# Patient Record
Sex: MALE | URBAN - METROPOLITAN AREA
[De-identification: names, ages, dates, MRNs, and addresses within clinical notes are randomized per-mention and may not be internally consistent; named-entity substitution may affect disease eponyms.]

---

## 2024-09-12 ENCOUNTER — OFFICE VISIT (OUTPATIENT)
Dept: URBAN - METROPOLITAN AREA CLINIC 113 | Facility: CLINIC | Age: 24
End: 2024-09-12
Payer: COMMERCIAL

## 2024-09-12 ENCOUNTER — LAB OUTSIDE AN ENCOUNTER (OUTPATIENT)
Dept: URBAN - METROPOLITAN AREA CLINIC 113 | Facility: CLINIC | Age: 24
End: 2024-09-12

## 2024-09-12 ENCOUNTER — DASHBOARD ENCOUNTERS (OUTPATIENT)
Age: 24
End: 2024-09-12

## 2024-09-12 VITALS — WEIGHT: 274 LBS | BODY MASS INDEX: 37.11 KG/M2 | TEMPERATURE: 97.3 F | RESPIRATION RATE: 18 BRPM | HEIGHT: 72 IN

## 2024-09-12 DIAGNOSIS — K21.9 GERD: ICD-10-CM

## 2024-09-12 PROCEDURE — 99204 OFFICE O/P NEW MOD 45 MIN: CPT | Performed by: NURSE PRACTITIONER

## 2024-09-12 RX ORDER — SUCRALFATE 1 G/1
1 TABLET ON AN EMPTY STOMACH TABLET ORAL
Qty: 120 TABLET | Refills: 3 | OUTPATIENT
Start: 2024-09-12 | End: 2025-01-09

## 2024-09-12 RX ORDER — DICLOFENAC SODIUM 75 MG/1
1 TABLET AS NEEDED TABLET, DELAYED RELEASE ORAL TWICE A DAY
Status: ACTIVE | COMMUNITY

## 2024-09-12 NOTE — HPI-TODAY'S VISIT:
23-year-old male presenting for evaluation of abdominal pain. Within the last month, he has experienced increasing symptoms of reflux and upper abdominal pain.  This seemed to begin after being on anti-inflammatories due to a knee surgery.  He complains of daily exacerbations of stabbing epigastric pain, stating his stomach feels like it is "on fire.".  He reports associated heartburn with occasional episodes of regurgitation.  He reports little relief with OTC omeprazole.  The symptoms are often worsened with eating.  He denies dysphagia, weight loss, red blood per rectum or black stool.  He is taking diclofenac twice daily.

## 2024-09-19 ENCOUNTER — CLAIMS CREATED FROM THE CLAIM WINDOW (OUTPATIENT)
Dept: URBAN - METROPOLITAN AREA CLINIC 4 | Facility: CLINIC | Age: 24
End: 2024-09-19
Payer: COMMERCIAL

## 2024-09-19 ENCOUNTER — CLAIMS CREATED FROM THE CLAIM WINDOW (OUTPATIENT)
Dept: URBAN - METROPOLITAN AREA SURGERY CENTER 25 | Facility: SURGERY CENTER | Age: 24
End: 2024-09-19
Payer: COMMERCIAL

## 2024-09-19 ENCOUNTER — LAB OUTSIDE AN ENCOUNTER (OUTPATIENT)
Dept: URBAN - METROPOLITAN AREA CLINIC 113 | Facility: CLINIC | Age: 24
End: 2024-09-19

## 2024-09-19 ENCOUNTER — TELEPHONE ENCOUNTER (OUTPATIENT)
Dept: URBAN - METROPOLITAN AREA CLINIC 113 | Facility: CLINIC | Age: 24
End: 2024-09-19

## 2024-09-19 DIAGNOSIS — R10.13 ABDOMINAL DISCOMFORT, EPIGASTRIC: ICD-10-CM

## 2024-09-19 DIAGNOSIS — K29.60 OTHER GASTRITIS WITHOUT BLEEDING: ICD-10-CM

## 2024-09-19 DIAGNOSIS — K21.00 GERD WITH ESOPHAGITIS WITHOUT BLEEDING: ICD-10-CM

## 2024-09-19 DIAGNOSIS — K21.00 GASTRO-ESOPHAGEAL REFLUX DISEASE WITH ESOPHAGITIS, WITHOUT BLEEDING: ICD-10-CM

## 2024-09-19 DIAGNOSIS — K31.89 OTHER DISEASES OF STOMACH AND DUODENUM: ICD-10-CM

## 2024-09-19 DIAGNOSIS — K26.9 DUODENAL ULCER, UNSPECIFIED AS ACUTE OR CHRONIC, WITHOUT HEMORRHAGE OR PERFORATION: ICD-10-CM

## 2024-09-19 PROCEDURE — 88312 SPECIAL STAINS GROUP 1: CPT | Performed by: PATHOLOGY

## 2024-09-19 PROCEDURE — 43239 EGD BIOPSY SINGLE/MULTIPLE: CPT | Performed by: INTERNAL MEDICINE

## 2024-09-19 PROCEDURE — 00731 ANES UPR GI NDSC PX NOS: CPT | Performed by: NURSE ANESTHETIST, CERTIFIED REGISTERED

## 2024-09-19 PROCEDURE — 88305 TISSUE EXAM BY PATHOLOGIST: CPT | Performed by: PATHOLOGY

## 2024-09-19 RX ORDER — DICLOFENAC SODIUM 75 MG/1
1 TABLET AS NEEDED TABLET, DELAYED RELEASE ORAL TWICE A DAY
Status: ACTIVE | COMMUNITY

## 2024-09-19 RX ORDER — DICYCLOMINE HYDROCHLORIDE 20 MG/1
1 TABLET TABLET ORAL
Qty: 60 | Refills: 11 | OUTPATIENT
Start: 2024-09-19 | End: 2025-09-14

## 2024-09-19 RX ORDER — PANTOPRAZOLE SODIUM 40 MG/1
1 TABLET TABLET, DELAYED RELEASE ORAL TWICE DAILY
Qty: 60 | Refills: 11 | OUTPATIENT
Start: 2024-09-19

## 2024-09-19 RX ORDER — SUCRALFATE 1 G/1
1 TABLET ON AN EMPTY STOMACH TABLET ORAL
Qty: 120 TABLET | Refills: 3 | Status: ACTIVE | COMMUNITY
Start: 2024-09-12 | End: 2025-01-09

## 2024-10-10 ENCOUNTER — OFFICE VISIT (OUTPATIENT)
Dept: URBAN - METROPOLITAN AREA CLINIC 113 | Facility: CLINIC | Age: 24
End: 2024-10-10
Payer: COMMERCIAL

## 2024-10-10 ENCOUNTER — LAB OUTSIDE AN ENCOUNTER (OUTPATIENT)
Dept: URBAN - METROPOLITAN AREA CLINIC 113 | Facility: CLINIC | Age: 24
End: 2024-10-10

## 2024-10-10 VITALS
DIASTOLIC BLOOD PRESSURE: 80 MMHG | TEMPERATURE: 97.5 F | HEIGHT: 72 IN | WEIGHT: 274.8 LBS | SYSTOLIC BLOOD PRESSURE: 122 MMHG | HEART RATE: 73 BPM | RESPIRATION RATE: 18 BRPM | BODY MASS INDEX: 37.22 KG/M2

## 2024-10-10 DIAGNOSIS — R11.0 NAUSEA: ICD-10-CM

## 2024-10-10 DIAGNOSIS — K21.00 GASTROESOPHAGEAL REFLUX DISEASE WITH ESOPHAGITIS WITHOUT HEMORRHAGE: ICD-10-CM

## 2024-10-10 DIAGNOSIS — K76.0 HEPATIC STEATOSIS: ICD-10-CM

## 2024-10-10 DIAGNOSIS — R10.13 EPIGASTRIC ABDOMINAL PAIN: ICD-10-CM

## 2024-10-10 DIAGNOSIS — R74.8 ELEVATED LIVER ENZYMES: ICD-10-CM

## 2024-10-10 DIAGNOSIS — R10.11 RIGHT UPPER QUADRANT ABDOMINAL PAIN: ICD-10-CM

## 2024-10-10 PROCEDURE — 99214 OFFICE O/P EST MOD 30 MIN: CPT | Performed by: NURSE PRACTITIONER

## 2024-10-10 RX ORDER — DICYCLOMINE HYDROCHLORIDE 20 MG/1
1 TABLET TABLET ORAL
Qty: 60 | Refills: 11 | Status: ACTIVE | COMMUNITY
Start: 2024-09-19 | End: 2025-09-14

## 2024-10-10 RX ORDER — DICLOFENAC SODIUM 75 MG/1
1 TABLET AS NEEDED TABLET, DELAYED RELEASE ORAL TWICE A DAY
OUTPATIENT

## 2024-10-10 RX ORDER — SUCRALFATE 1 G/1
1 TABLET ON AN EMPTY STOMACH TABLET ORAL
OUTPATIENT
Start: 2024-09-12

## 2024-10-10 RX ORDER — DICLOFENAC SODIUM 75 MG/1
1 TABLET AS NEEDED TABLET, DELAYED RELEASE ORAL TWICE A DAY
Status: ACTIVE | COMMUNITY

## 2024-10-10 RX ORDER — PANTOPRAZOLE SODIUM 40 MG/1
1 TABLET TABLET, DELAYED RELEASE ORAL TWICE DAILY
Qty: 60 | Refills: 11 | Status: ACTIVE | COMMUNITY
Start: 2024-09-19

## 2024-10-10 RX ORDER — DICYCLOMINE HYDROCHLORIDE 20 MG/1
1 TABLET TABLET ORAL
OUTPATIENT
Start: 2024-09-19

## 2024-10-10 RX ORDER — SUCRALFATE 1 G/1
1 TABLET ON AN EMPTY STOMACH TABLET ORAL
Qty: 120 TABLET | Refills: 3 | Status: ACTIVE | COMMUNITY
Start: 2024-09-12 | End: 2025-01-09

## 2024-10-10 RX ORDER — PANTOPRAZOLE SODIUM 40 MG/1
1 TABLET TABLET, DELAYED RELEASE ORAL TWICE DAILY
OUTPATIENT
Start: 2024-09-19

## 2024-10-10 NOTE — HPI-TODAY'S VISIT:
23-year-old male presenting for follow-up of abdominal pain. He was seen in the office in September for evaluation of a recent exacerbation of epigastric pain and reflux concerning for peptic ulcer disease in the setting of diclofenac and ibuprofen following recent orthopedic surgery. His symptoms had been refractory to OTC PPI. An upper endoscopy was planned. He was recommended a trial of Carafate. EGD 9/19/24: LA grade A reflux esophagitis limited to the GEJ, patulous lower esophageal sphincter, mild erosive bulbar duodenitis, mild nonerosive antral gastritis. Antral biopsies were negative for H. pylori. Subsequent abdominal ultrasound 9/24/24: hepatomegaly with moderate hepatic steatosis. His symptoms are largely unchanged. He continues to experience daily exacerbations of epigastric and right upper quadrant abdominal pain, which is worsened within 20 minutes of eating. He describes the pain as burning and sharp, occasionally stabbing in nature. He reports associated nausea without vomiting. The pain often awakens him at night, lasting about an hour. He remains on pantoprazole twice daily. Carafate and dicyclomine provide some relief. He was seen in the ER last week at [facility]. Labs showed elevated liver enzymes. Abdominal xray was negative but no additional imaging was performed.

## 2024-10-16 LAB
(TTG) AB, IGA: <1
(TTG) AB, IGG: <1
A/G RATIO: 1.4
ABSOLUTE BASOPHILS: 27
ABSOLUTE EOSINOPHILS: 189
ABSOLUTE LYMPHOCYTES: 2817
ABSOLUTE MONOCYTES: 585
ABSOLUTE NEUTROPHILS: 5382
ACTIN (SMOOTH MUSCLE) ANTIBODY (IGG): <20
ALBUMIN: 4.3
ALKALINE PHOSPHATASE: 61
ALPHA-1-ANTITRYPSIN QN: 144
ALT (SGPT): 66
ANA SCREEN, IFA: NEGATIVE
ANTIGLIADIN ABS, IGA: 5.7
AST (SGOT): 30
BASOPHILS: 0.3
BILIRUBIN, TOTAL: 1.4
BUN/CREATININE RATIO: (no result)
BUN: 13
CALCIUM: 9.3
CARBON DIOXIDE, TOTAL: 26
CERULOPLASMIN: 28
CHLORIDE: 105
CREATININE: 0.69
EGFR: 133
EOSINOPHILS: 2.1
FERRITIN, SERUM: 143
GLIADIN (DEAMIDATED) AB (IGG): <1
GLOBULIN, TOTAL: 3
GLUCOSE: 106
HBSAG SCREEN: (no result)
HEMATOCRIT: 44.9
HEMOGLOBIN: 14.9
HEP A AB, IGM: (no result)
HEP B CORE AB, IGM: (no result)
HEPATITIS C ANTIBODY: (no result)
IMMUNOGLOBULIN A: 210
IMMUNOGLOBULIN A: 210
IMMUNOGLOBULIN G: 1265
IMMUNOGLOBULIN M: 134
IRON BIND.CAP.(TIBC): 285
IRON SATURATION: 41
IRON: 118
LKM-1 ANTIBODY (IGG): <=20
LYMPHOCYTES: 31.3
MCH: 30.2
MCHC: 33.2
MCV: 91.1
MITOCHONDRIAL (M2) ANTIBODY: <=20
MONOCYTES: 6.5
MPV: 10.8
NEUTROPHILS: 59.8
PLATELET COUNT: 221
POTASSIUM: 3.9
PROTEIN, TOTAL: 7.3
RDW: 12.7
RED BLOOD CELL COUNT: 4.93
SODIUM: 140
TSH W/REFLEX TO FT4: 1.31
WHITE BLOOD CELL COUNT: 9

## 2024-11-08 ENCOUNTER — TELEPHONE ENCOUNTER (OUTPATIENT)
Dept: URBAN - METROPOLITAN AREA CLINIC 113 | Facility: CLINIC | Age: 24
End: 2024-11-08

## 2024-11-21 ENCOUNTER — OFFICE VISIT (OUTPATIENT)
Dept: URBAN - METROPOLITAN AREA CLINIC 113 | Facility: CLINIC | Age: 24
End: 2024-11-21
Payer: COMMERCIAL

## 2024-11-21 VITALS
SYSTOLIC BLOOD PRESSURE: 143 MMHG | WEIGHT: 273.6 LBS | TEMPERATURE: 98.11 F | HEART RATE: 80 BPM | DIASTOLIC BLOOD PRESSURE: 96 MMHG | RESPIRATION RATE: 20 BRPM | HEIGHT: 72 IN | BODY MASS INDEX: 37.06 KG/M2

## 2024-11-21 DIAGNOSIS — R10.11 RIGHT UPPER QUADRANT ABDOMINAL PAIN: ICD-10-CM

## 2024-11-21 DIAGNOSIS — R19.7 DIARRHEA: ICD-10-CM

## 2024-11-21 DIAGNOSIS — K21.00 GASTROESOPHAGEAL REFLUX DISEASE WITH ESOPHAGITIS WITHOUT HEMORRHAGE: ICD-10-CM

## 2024-11-21 DIAGNOSIS — R10.13 EPIGASTRIC ABDOMINAL PAIN: ICD-10-CM

## 2024-11-21 PROCEDURE — 99214 OFFICE O/P EST MOD 30 MIN: CPT | Performed by: NURSE PRACTITIONER

## 2024-11-21 RX ORDER — PANTOPRAZOLE SODIUM 40 MG/1
1 TABLET TABLET, DELAYED RELEASE ORAL TWICE DAILY
OUTPATIENT
Start: 2024-09-19

## 2024-11-21 RX ORDER — SUCRALFATE 1 G/1
1 TABLET ON AN EMPTY STOMACH TABLET ORAL
OUTPATIENT
Start: 2024-09-12

## 2024-11-21 RX ORDER — SUCRALFATE 1 G/1
1 TABLET ON AN EMPTY STOMACH TABLET ORAL
Status: ACTIVE | COMMUNITY
Start: 2024-09-12

## 2024-11-21 RX ORDER — HYOSCYAMINE SULFATE 0.12 MG/1
PLACE 1 TABLET UNDER THE TONGUE AND ALLOW TO DISSOLVE AS NEEDED FOR ABDOMINAL PAIN TABLET SUBLINGUAL
Qty: 45 | Refills: 1 | OUTPATIENT
Start: 2024-11-21 | End: 2024-12-11

## 2024-11-21 RX ORDER — DICYCLOMINE HYDROCHLORIDE 20 MG/1
1 TABLET TABLET ORAL
Status: ACTIVE | COMMUNITY
Start: 2024-09-19

## 2024-11-21 RX ORDER — PANTOPRAZOLE SODIUM 40 MG/1
1 TABLET TABLET, DELAYED RELEASE ORAL TWICE DAILY
Status: ACTIVE | COMMUNITY
Start: 2024-09-19

## 2024-11-21 NOTE — PHYSICAL EXAM CONSTITUTIONAL:
Medication refill request received from [facility] for:    Mirtazapine 45 mg tablet, take 1/2 tablet by mouth every night at bedtime. Last prescribed on 4/24/17 quantity 45 plus 3 refills, sent to [email or web address] mail order.        Hydroxyzine 25 mg tablet, take two tablets by mouth three times daily, quantity 180. Last prescribed on 4/6/17 quantity 180 plus 3 refills, sent to [email or web address] mail order.      Sertraline 100 mg tablet, take 1.5 tablets by mouth daily. Last prescribed on 4/6/16 quantity 135 plus 1 refill, sent to [email or web address] mail order.        Last visit: 4/6/17    Next visit: 7/27/17    Verified prescription/s in provider notation at above appointment.     Refill approved for medication/s listed above with one refill      alert , pleasant, well nourished, in no acute distress

## 2024-11-21 NOTE — HPI-OTHER HISTORIES
EGD 9/19/24: LA grade A reflux esophagitis limited to the GEJ, patulous lower esophageal sphincter, mild erosive bulbar duodenitis, mild nonerosive antral gastritis. Antral biopsies were negative for H. pylori. Subsequent abdominal ultrasound 9/24/24: hepatomegaly with moderate hepatic steatosis.

## 2024-11-21 NOTE — HPI-TODAY'S VISIT:
23-year-old male presenting for follow-up of abdominal pain. He was seen in the office on 10/10/24 for follow-up regarding ongoing symptoms of upper abdominal pain and nausea, persistent despite PPI, Carafate, and dicyclomine. Recent EGD showed reflux esophagitis, erosive gastritis and duodenitis; biopsies were negative. US was negative for gallbladder pathology. Labs showed elevated liver enzymes suspected to be secondary to fatty liver. Additional serologic work-up was planned to exclude secondary causes of advanced liver disease. A CT of the abdomen and pelvis and HIDA scan were planned. HIDA scan 11/1/2024 was without evidence for acute cholecystitis, normal gallbladder ejection fraction 95%. CT of the abdomen and pelvis with contrast 10/29/2024:No acute process. He notified our office on 11/8/2020 for worsening abdominal pain and bloating with new onset of explosive diarrhea.  Stool studies were planned.  He was referred to surgery for consideration for cholecystectomy due to concern for functional gallbladder disorder. He has not completed the stool studies, but has an appointment pending with Dr. Olson next week.  His symptoms are largely unchanged.  He complains of worsening abdominal pain which is now occurring on a daily basis.  He complains of stabbing epigastric and right upper quadrant abdominal pain with associated bloating.  He complains of nausea without vomiting.  This is led to decrease in appetite.  The symptoms are worsened with eating and drinking a little awaken him during the night.  He reports no relief from dicyclomine or Carafate.  He complains of an increase in diarrhea with at least 2 loose, urgent stools per day.  No blood in the stool.  He has stopped diclofenac.  He remains on pantoprazole twice daily.

## 2024-11-26 ENCOUNTER — OFFICE VISIT (OUTPATIENT)
Dept: URBAN - METROPOLITAN AREA CLINIC 113 | Facility: CLINIC | Age: 24
End: 2024-11-26

## 2024-12-04 ENCOUNTER — TELEPHONE ENCOUNTER (OUTPATIENT)
Dept: URBAN - METROPOLITAN AREA CLINIC 113 | Facility: CLINIC | Age: 24
End: 2024-12-04